# Patient Record
Sex: MALE | Race: WHITE | NOT HISPANIC OR LATINO | URBAN - METROPOLITAN AREA
[De-identification: names, ages, dates, MRNs, and addresses within clinical notes are randomized per-mention and may not be internally consistent; named-entity substitution may affect disease eponyms.]

---

## 2019-03-28 ENCOUNTER — HOSPITAL ENCOUNTER (OUTPATIENT)
Dept: ULTRASOUND IMAGING | Facility: HOSPITAL | Age: 49
Discharge: HOME | End: 2019-03-28
Attending: FAMILY MEDICINE | Admitting: FAMILY MEDICINE

## 2019-03-29 ENCOUNTER — HOSPITAL ENCOUNTER (OUTPATIENT)
Dept: ULTRASOUND IMAGING | Facility: HOSPITAL | Age: 49
End: 2019-03-29
Attending: FAMILY MEDICINE | Admitting: FAMILY MEDICINE

## 2023-10-26 ENCOUNTER — OFFICE VISIT (OUTPATIENT)
Dept: CARDIOLOGY | Facility: CLINIC | Age: 53
End: 2023-10-26
Payer: COMMERCIAL

## 2023-10-26 VITALS
HEIGHT: 76 IN | BODY MASS INDEX: 36.9 KG/M2 | DIASTOLIC BLOOD PRESSURE: 86 MMHG | OXYGEN SATURATION: 97 % | SYSTOLIC BLOOD PRESSURE: 128 MMHG | WEIGHT: 303 LBS | HEART RATE: 80 BPM

## 2023-10-26 DIAGNOSIS — R00.2 PALPITATIONS: Primary | ICD-10-CM

## 2023-10-26 DIAGNOSIS — G47.33 OBSTRUCTIVE SLEEP APNEA SYNDROME: ICD-10-CM

## 2023-10-26 DIAGNOSIS — R06.09 DYSPNEA ON EXERTION: ICD-10-CM

## 2023-10-26 DIAGNOSIS — I10 PRIMARY HYPERTENSION: ICD-10-CM

## 2023-10-26 PROCEDURE — 93000 ELECTROCARDIOGRAM COMPLETE: CPT | Performed by: INTERNAL MEDICINE

## 2023-10-26 PROCEDURE — 99204 OFFICE O/P NEW MOD 45 MIN: CPT | Performed by: INTERNAL MEDICINE

## 2023-10-26 RX ORDER — PANTOPRAZOLE SODIUM 40 MG/1
40 TABLET, DELAYED RELEASE ORAL DAILY
COMMUNITY
End: 2023-10-26

## 2023-10-26 RX ORDER — LISINOPRIL 20 MG/1
20 TABLET ORAL DAILY
COMMUNITY

## 2023-10-26 RX ORDER — ASPIRIN 81 MG/1
81 TABLET ORAL DAILY
COMMUNITY

## 2023-10-26 NOTE — PROGRESS NOTES
Cc--- dyspnea and jaw discomfort    SUB  53-year-old pleasant patient with no prior history of coronary artery disease was seen urgently in the office setting when he experienced unusual dyspnea on exertion and also some jaw discomfort.  ECG was done without acute changes and subsequently patient was advised cardiology evaluation.  Since then patient did not have any symptoms.  Risk factors include hypertension and strong family history of premature coronary artery disease.  Lipid status and lipid panel Labs requested from primary care physician's office.  Patient with obstructive sleep apnea in the past on CPAP and lost weight and since then not been using CPAP machine.  He monitors his blood pressure regularly which is well controlled.  When he had discomfort in his chest and jaw, patient's blood pressure was elevated according to him.  He also had transient palpitations during the time  No history of any documented arrhythmias.  Patient has history of DVT and pulm embolus with prior Achilles surgery several years ago.      Past Medical History:   Diagnosis Date   • Deep vein thrombosis     after achilles surgery   • Hypertension 2013   • Pulmonary embolism     after achilles surgery   • Sleep apnea     No longer using Machine     History reviewed. No pertinent surgical history.  Family History   Problem Relation Age of Onset   • Heart disease Father              Social History     Tobacco Use   • Smoking status: Never     Passive exposure: Never   • Tobacco comments:     Never used any type of tobacco product   Vaping Use   • Vaping Use: Never used   Substance Use Topics   • Alcohol use: Not Currently     Comment: Very seldom, on a rare occasion socially   • Drug use: Never         Physical Exam    General:      well developed, well nourished, in no acute distress.    Head:      normocephalic and atraumatic.    Eyes:      PERRL/EOM intact, conjunctivae and sclerae clear without  nystagmus.    Neck:      no  thyromegaly, trachea central with normal respiratory effort  Lungs:      clear bilaterally to auscultation.    Heart:       regular rate and rhythm, S1, S2 without murmurs, rubs, or gallops  Skin:      intact without lesions or rashes.    Psych:      alert and cooperative; normal mood and affect; normal attention span and concentration.        Assessment plan    Recent symptoms of exertional dyspnea and jaw pain  Essential hypertension well-controlled with current intake of lisinopril  Home monitoring to be continued and treatment goals for hypertension discussed with the patient and low-salt diet educated  Lipid panel Labs requested from primary care physician's office  Strong family history of coronary artery disease  Evaluate patient's symptoms by getting echocardiography  Stress test ordered to exclude ischemia  Continue lisinopril and aspirin  Reevaluate in 3 months  Prior history of obstructive sleep apnea currently not on CPAP therapy          ECG 12 Lead    Date/Time: 10/26/2023 4:28 PM  Performed by: Tha Torres MD    Authorized by: Tha Torres MD  Comparison: compared with previous ECG   Similar to previous ECG  Rhythm: sinus rhythm  Rate: normal  Conduction: conduction normal        Electronically signed by Tha Torres MD, 10/26/23, 4:28 PM EDT.